# Patient Record
Sex: FEMALE | Race: WHITE | Employment: OTHER | URBAN - METROPOLITAN AREA
[De-identification: names, ages, dates, MRNs, and addresses within clinical notes are randomized per-mention and may not be internally consistent; named-entity substitution may affect disease eponyms.]

---

## 2018-06-14 ENCOUNTER — APPOINTMENT (OUTPATIENT)
Dept: GENERAL RADIOLOGY | Age: 53
End: 2018-06-14

## 2018-06-14 ENCOUNTER — HOSPITAL ENCOUNTER (EMERGENCY)
Age: 53
Discharge: HOME | End: 2018-06-14
Attending: EMERGENCY MEDICINE

## 2018-06-14 VITALS
OXYGEN SATURATION: 99 % | TEMPERATURE: 98.4 F | SYSTOLIC BLOOD PRESSURE: 126 MMHG | HEIGHT: 65 IN | BODY MASS INDEX: 20.83 KG/M2 | HEART RATE: 86 BPM | DIASTOLIC BLOOD PRESSURE: 65 MMHG | WEIGHT: 125 LBS | RESPIRATION RATE: 16 BRPM

## 2018-06-14 DIAGNOSIS — S22.32XA CLOSED FRACTURE OF ONE RIB OF LEFT SIDE, INITIAL ENCOUNTER: Primary | ICD-10-CM

## 2018-06-14 PROCEDURE — 99283 EMERGENCY DEPT VISIT LOW MDM: CPT

## 2018-06-14 PROCEDURE — 71101 X-RAY EXAM UNILAT RIBS/CHEST: CPT

## 2018-06-14 RX ORDER — HYDROCODONE BITARTRATE AND ACETAMINOPHEN 5; 325 MG/1; MG/1
1 TABLET ORAL EVERY 6 HOURS PRN
Qty: 12 TABLET | Refills: 0 | Status: SHIPPED | OUTPATIENT
Start: 2018-06-14 | End: 2018-06-17

## 2018-06-14 ASSESSMENT — ENCOUNTER SYMPTOMS
ALLERGIC/IMMUNOLOGIC NEGATIVE: 1
ABDOMINAL PAIN: 0
WHEEZING: 0
COUGH: 0
CHEST TIGHTNESS: 0
SHORTNESS OF BREATH: 0
EYES NEGATIVE: 1
VOMITING: 0
NAUSEA: 0
RHINORRHEA: 0
TROUBLE SWALLOWING: 0

## 2018-06-14 ASSESSMENT — PAIN DESCRIPTION - DESCRIPTORS: DESCRIPTORS: ACHING

## 2018-06-14 ASSESSMENT — PAIN SCALES - GENERAL: PAINLEVEL_OUTOF10: 6

## 2018-06-14 ASSESSMENT — PAIN DESCRIPTION - ORIENTATION: ORIENTATION: LEFT

## 2018-06-14 ASSESSMENT — PAIN DESCRIPTION - LOCATION: LOCATION: RIB CAGE

## 2019-05-10 ENCOUNTER — HOSPITAL ENCOUNTER (OUTPATIENT)
Dept: WOMENS IMAGING | Age: 54
Discharge: HOME | End: 2019-05-12
Payer: COMMERCIAL

## 2019-05-10 DIAGNOSIS — Z12.31 ENCOUNTER FOR SCREENING MAMMOGRAM FOR BREAST CANCER: ICD-10-CM

## 2019-05-10 PROCEDURE — 77067 SCR MAMMO BI INCL CAD: CPT

## 2023-10-20 PROBLEM — F41.0 PANIC ATTACKS: Status: ACTIVE | Noted: 2023-10-20

## 2023-10-20 PROBLEM — C43.9 MELANOMA (MULTI): Status: ACTIVE | Noted: 2023-10-20

## 2023-10-20 PROBLEM — F41.1 GAD (GENERALIZED ANXIETY DISORDER): Status: ACTIVE | Noted: 2023-10-20

## 2023-10-20 PROBLEM — E78.2 MIXED HYPERLIPIDEMIA: Status: ACTIVE | Noted: 2023-10-20

## 2023-10-20 PROBLEM — F90.9 ADHD: Status: ACTIVE | Noted: 2023-10-20

## 2023-10-20 PROBLEM — F43.20 ADJUSTMENT DISORDER: Status: ACTIVE | Noted: 2023-10-20

## 2023-10-20 PROBLEM — E55.9 VITAMIN D DEFICIENCY DISEASE: Status: ACTIVE | Noted: 2023-10-20

## 2023-10-20 RX ORDER — ALPRAZOLAM 0.25 MG/1
0.25 TABLET ORAL DAILY PRN
COMMUNITY
Start: 2022-02-25 | End: 2024-03-20 | Stop reason: SDUPTHER

## 2023-10-20 RX ORDER — ATORVASTATIN CALCIUM 20 MG/1
1 TABLET, FILM COATED ORAL NIGHTLY
COMMUNITY
End: 2024-03-20 | Stop reason: SDUPTHER

## 2023-10-20 RX ORDER — LISDEXAMFETAMINE DIMESYLATE CAPSULES 20 MG/1
20 CAPSULE ORAL EVERY MORNING
COMMUNITY
End: 2023-10-26 | Stop reason: WASHOUT

## 2023-10-20 RX ORDER — DEXTROAMPHETAMINE SACCHARATE, AMPHETAMINE ASPARTATE, DEXTROAMPHETAMINE SULFATE AND AMPHETAMINE SULFATE 7.5; 7.5; 7.5; 7.5 MG/1; MG/1; MG/1; MG/1
1 TABLET ORAL DAILY
COMMUNITY
Start: 2023-01-07 | End: 2023-10-26 | Stop reason: SDUPTHER

## 2023-10-25 ENCOUNTER — APPOINTMENT (OUTPATIENT)
Dept: PRIMARY CARE | Facility: CLINIC | Age: 58
End: 2023-10-25

## 2023-10-26 ENCOUNTER — OFFICE VISIT (OUTPATIENT)
Dept: PRIMARY CARE | Facility: CLINIC | Age: 58
End: 2023-10-26

## 2023-10-26 VITALS
BODY MASS INDEX: 21.45 KG/M2 | TEMPERATURE: 98 F | HEART RATE: 94 BPM | HEIGHT: 66 IN | DIASTOLIC BLOOD PRESSURE: 76 MMHG | SYSTOLIC BLOOD PRESSURE: 110 MMHG | WEIGHT: 133.5 LBS | OXYGEN SATURATION: 98 %

## 2023-10-26 DIAGNOSIS — F32.A DEPRESSION, UNSPECIFIED DEPRESSION TYPE: ICD-10-CM

## 2023-10-26 DIAGNOSIS — Z23 ENCOUNTER FOR IMMUNIZATION: ICD-10-CM

## 2023-10-26 DIAGNOSIS — F90.9 ATTENTION DEFICIT HYPERACTIVITY DISORDER (ADHD), UNSPECIFIED ADHD TYPE: Primary | ICD-10-CM

## 2023-10-26 PROCEDURE — 1036F TOBACCO NON-USER: CPT | Performed by: INTERNAL MEDICINE

## 2023-10-26 PROCEDURE — 90686 IIV4 VACC NO PRSV 0.5 ML IM: CPT | Performed by: INTERNAL MEDICINE

## 2023-10-26 PROCEDURE — 90471 IMMUNIZATION ADMIN: CPT | Performed by: INTERNAL MEDICINE

## 2023-10-26 PROCEDURE — 99214 OFFICE O/P EST MOD 30 MIN: CPT | Performed by: INTERNAL MEDICINE

## 2023-10-26 RX ORDER — DEXTROAMPHETAMINE SACCHARATE, AMPHETAMINE ASPARTATE, DEXTROAMPHETAMINE SULFATE AND AMPHETAMINE SULFATE 7.5; 7.5; 7.5; 7.5 MG/1; MG/1; MG/1; MG/1
1 TABLET ORAL DAILY
Qty: 30 TABLET | Refills: 0 | Status: SHIPPED | OUTPATIENT
Start: 2023-11-01 | End: 2023-10-26 | Stop reason: SDUPTHER

## 2023-10-26 RX ORDER — ALPRAZOLAM 0.25 MG/1
0.25 TABLET ORAL DAILY PRN
Qty: 7 TABLET | Refills: 0 | Status: CANCELLED | OUTPATIENT
Start: 2023-10-26

## 2023-10-26 RX ORDER — ESCITALOPRAM OXALATE 20 MG/1
20 TABLET ORAL DAILY
Qty: 30 TABLET | Refills: 5 | Status: SHIPPED | OUTPATIENT
Start: 2023-10-26 | End: 2024-01-25 | Stop reason: WASHOUT

## 2023-10-26 RX ORDER — LORAZEPAM 0.5 MG/1
0.5 TABLET ORAL DAILY
Qty: 10 TABLET | Refills: 0 | Status: SHIPPED | OUTPATIENT
Start: 2023-10-26 | End: 2023-11-05

## 2023-10-26 RX ORDER — DEXTROAMPHETAMINE SACCHARATE, AMPHETAMINE ASPARTATE, DEXTROAMPHETAMINE SULFATE AND AMPHETAMINE SULFATE 7.5; 7.5; 7.5; 7.5 MG/1; MG/1; MG/1; MG/1
1 TABLET ORAL DAILY
Qty: 30 TABLET | Refills: 0 | Status: SHIPPED | OUTPATIENT
Start: 2023-12-01 | End: 2023-10-26 | Stop reason: SDUPTHER

## 2023-10-26 RX ORDER — DEXTROAMPHETAMINE SACCHARATE, AMPHETAMINE ASPARTATE, DEXTROAMPHETAMINE SULFATE AND AMPHETAMINE SULFATE 7.5; 7.5; 7.5; 7.5 MG/1; MG/1; MG/1; MG/1
1 TABLET ORAL DAILY
Qty: 30 TABLET | Refills: 0 | Status: SHIPPED | OUTPATIENT
Start: 2024-01-01 | End: 2024-01-25 | Stop reason: SDUPTHER

## 2023-10-26 ASSESSMENT — PATIENT HEALTH QUESTIONNAIRE - PHQ9
1. LITTLE INTEREST OR PLEASURE IN DOING THINGS: NOT AT ALL
SUM OF ALL RESPONSES TO PHQ9 QUESTIONS 1 AND 2: 0
2. FEELING DOWN, DEPRESSED OR HOPELESS: NOT AT ALL

## 2023-10-26 NOTE — PROGRESS NOTES
"In SUBJECTIVE:   Dayna Lazo is a 58 y.o. female who presents for Med Management (Patient in office today for med management. ).    HISTORY OF PRESENT ILLNESS:  Dayna Lazo  is a pleasant 58-year-old female comes in for 3 months follow-up.  She is on CNS stimulant for ADD.  She is also complaining of anxiety.  She is going through some family stressors.  At times she goes into panic attack.  Patient has contract with me for a controlled medicine. The OARRS report has been checked today. There is no aberrancy. Patient wants to stay on the same medicine as it is helpful for day to day life. As of yet, patient did not experienced any obvious adverse effect. However the potential side effects have been discussed again during this visit.               Review of Systems   Constitutional:  Negative for activity change and fever.   HENT:  Negative for hearing loss, nosebleeds and tinnitus.    Eyes:  Negative for redness.   Respiratory:  Negative for chest tightness and stridor.    Cardiovascular:  Negative for chest pain, palpitations and leg swelling.   Gastrointestinal:  Negative for blood in stool.   Endocrine: Negative for cold intolerance.   Genitourinary:  Negative for hematuria.   Musculoskeletal:  Negative for joint swelling.   Skin:  Negative for rash.   Neurological:  Negative for speech difficulty and light-headedness.   Psychiatric/Behavioral:  Negative for behavioral problems.        Visit Vitals  /76 (BP Location: Left arm, Patient Position: Sitting)   Pulse 94   Temp 36.7 °C (98 °F) (Temporal)   Ht 1.664 m (5' 5.5\")   Wt 60.6 kg (133 lb 8 oz)   SpO2 98%   BMI 21.88 kg/m²   Smoking Status Never   BSA 1.67 m²             Wt Readings from Last 10 Encounters:   10/26/23 60.6 kg (133 lb 8 oz)   06/29/23 56.7 kg (125 lb)   06/08/23 55.3 kg (122 lb)   05/04/23 57.2 kg (126 lb)   01/09/23 61.6 kg (135 lb 12.8 oz)   11/03/22 59.9 kg (132 lb)   08/03/22 57.3 kg (126 lb 4 oz)   05/03/22 55.8 kg (123 lb) "   02/25/22 52.7 kg (116 lb 4 oz)   10/18/21 57.3 kg (126 lb 6.4 oz)            Physical Exam  Constitutional:       General: She is not in acute distress.     Appearance: Normal appearance.   HENT:      Head: Normocephalic.      Right Ear: Tympanic membrane normal.      Left Ear: Tympanic membrane normal.      Mouth/Throat:      Mouth: Mucous membranes are moist.   Cardiovascular:      Rate and Rhythm: Normal rate and regular rhythm.      Heart sounds: No murmur heard.  Pulmonary:      Effort: No respiratory distress.   Abdominal:      Palpations: Abdomen is soft.   Musculoskeletal:      Cervical back: Neck supple.      Right lower leg: No edema.      Left lower leg: No edema.   Skin:     Findings: No rash.   Neurological:      General: No focal deficit present.      Mental Status: She is alert and oriented to person, place, and time.   Psychiatric:         Mood and Affect: Mood normal.            RECENT LABS:  Lab Results   Component Value Date    WBC 3.4 (L) 10/19/2020    HGB 13.7 10/19/2020    HCT 42.2 10/19/2020     10/19/2020    CHOL 189 10/19/2020    TRIG 73 10/19/2020    HDL 78.0 10/19/2020    ALT 17 10/19/2020    AST 20 10/19/2020     10/19/2020    K 3.7 10/19/2020    CL 99 10/19/2020    CREATININE 0.85 10/19/2020    BUN 11 10/19/2020    CO2 31 10/19/2020    TSH 0.90 10/19/2020       IMAGING:  Patient was never admitted.  No results found.     MEDICATIONS:   Current Outpatient Medications   Medication Instructions    ALPRAZolam (XANAX) 0.25 mg, oral, Daily PRN    [START ON 1/1/2024] amphetamine-dextroamphetamine (Adderall) 30 mg tablet 30 mg, oral, Daily    atorvastatin (Lipitor) 20 mg tablet 1 tablet, oral, Nightly    escitalopram (LEXAPRO) 20 mg, oral, Daily    LORazepam (ATIVAN) 0.5 mg, oral, Daily        TODAY'S VISIT  DX:   1. Attention deficit hyperactivity disorder (ADHD), unspecified ADHD type  amphetamine-dextroamphetamine (Adderall) 30 mg tablet    DISCONTINUED:  amphetamine-dextroamphetamine (Adderall) 30 mg tablet    DISCONTINUED: amphetamine-dextroamphetamine (Adderall) 30 mg tablet      2. Encounter for immunization  Flu vaccine (IIV4) age 6 months and greater, preservative free      3. Depression, unspecified depression type  escitalopram (Lexapro) 20 mg tablet    LORazepam (Ativan) 0.5 mg tablet             MEDICAL DECISION MAKING:  A. Recent lab work and relevant imaging studies have been reviewed.    B. Relevant correspondence/notes from specialty consultants were reviewed and discussed with patient.    C. The current active medical co morbidities have been considered.   D. Medication have been sent for refill.    E. Patient will continue with current medical therapy and plan.   F. I will see patient back in 3 months.

## 2023-11-01 ASSESSMENT — ENCOUNTER SYMPTOMS
EYE REDNESS: 0
JOINT SWELLING: 0
STRIDOR: 0
HEMATURIA: 0
FEVER: 0
BLOOD IN STOOL: 0
LIGHT-HEADEDNESS: 0
ACTIVITY CHANGE: 0
SPEECH DIFFICULTY: 0
PALPITATIONS: 0
CHEST TIGHTNESS: 0

## 2024-01-25 ENCOUNTER — OFFICE VISIT (OUTPATIENT)
Dept: PRIMARY CARE | Facility: CLINIC | Age: 59
End: 2024-01-25

## 2024-01-25 VITALS
SYSTOLIC BLOOD PRESSURE: 120 MMHG | HEIGHT: 66 IN | TEMPERATURE: 98.5 F | WEIGHT: 142 LBS | OXYGEN SATURATION: 98 % | BODY MASS INDEX: 22.82 KG/M2 | HEART RATE: 89 BPM | DIASTOLIC BLOOD PRESSURE: 78 MMHG

## 2024-01-25 DIAGNOSIS — E78.2 MIXED DYSLIPIDEMIA: ICD-10-CM

## 2024-01-25 DIAGNOSIS — E55.9 VITAMIN D INSUFFICIENCY: ICD-10-CM

## 2024-01-25 DIAGNOSIS — Z00.00 WELLNESS EXAMINATION: ICD-10-CM

## 2024-01-25 DIAGNOSIS — F90.9 ATTENTION DEFICIT HYPERACTIVITY DISORDER (ADHD), UNSPECIFIED ADHD TYPE: ICD-10-CM

## 2024-01-25 DIAGNOSIS — I10 ESSENTIAL (PRIMARY) HYPERTENSION: Primary | ICD-10-CM

## 2024-01-25 PROCEDURE — 99214 OFFICE O/P EST MOD 30 MIN: CPT | Performed by: INTERNAL MEDICINE

## 2024-01-25 PROCEDURE — 1036F TOBACCO NON-USER: CPT | Performed by: INTERNAL MEDICINE

## 2024-01-25 PROCEDURE — 3074F SYST BP LT 130 MM HG: CPT | Performed by: INTERNAL MEDICINE

## 2024-01-25 PROCEDURE — 3078F DIAST BP <80 MM HG: CPT | Performed by: INTERNAL MEDICINE

## 2024-01-25 RX ORDER — DEXTROAMPHETAMINE SACCHARATE, AMPHETAMINE ASPARTATE, DEXTROAMPHETAMINE SULFATE AND AMPHETAMINE SULFATE 7.5; 7.5; 7.5; 7.5 MG/1; MG/1; MG/1; MG/1
1 TABLET ORAL DAILY
Qty: 30 TABLET | Refills: 0 | Status: SHIPPED | OUTPATIENT
Start: 2024-02-25 | End: 2024-01-25 | Stop reason: SDUPTHER

## 2024-01-25 RX ORDER — DEXTROAMPHETAMINE SACCHARATE, AMPHETAMINE ASPARTATE, DEXTROAMPHETAMINE SULFATE AND AMPHETAMINE SULFATE 7.5; 7.5; 7.5; 7.5 MG/1; MG/1; MG/1; MG/1
1 TABLET ORAL DAILY
Qty: 30 TABLET | Refills: 0 | Status: SHIPPED | OUTPATIENT
Start: 2024-02-25 | End: 2024-03-20 | Stop reason: SDUPTHER

## 2024-01-25 RX ORDER — DEXTROAMPHETAMINE SACCHARATE, AMPHETAMINE ASPARTATE, DEXTROAMPHETAMINE SULFATE AND AMPHETAMINE SULFATE 7.5; 7.5; 7.5; 7.5 MG/1; MG/1; MG/1; MG/1
1 TABLET ORAL DAILY
Qty: 30 TABLET | Refills: 0 | Status: SHIPPED | OUTPATIENT
Start: 2024-01-25 | End: 2024-01-25 | Stop reason: SDUPTHER

## 2024-01-25 ASSESSMENT — ENCOUNTER SYMPTOMS
OCCASIONAL FEELINGS OF UNSTEADINESS: 0
LOSS OF SENSATION IN FEET: 0
DEPRESSION: 0

## 2024-01-25 NOTE — PROGRESS NOTES
CHIEF COMPLAINT:    Dayna Lazo is a 58 y.o. female who presents for Follow-up (Patient here for 3 month follow-up. Patient stated that he stopped taking Lexapro due to weight gain and wants to discuss with physician. ).    HISTORY OF PRESENT ILLNESS:  Dayna Lazo  is a pleasant 58-year-old female comes here for her routine medical checkup.  She does have ADD.  And takes Adderall.  She was also going through some depressive illness this.  She was on Lexapro.  However she thought she was gaining weight on Lexapro and held the pill.  So far she is doing well.  She does not have any anxiety or panic attack.  Although patient was asking to change that group and be on some SSRI.  I discouraged her because every medicine has some sort of side effect.  It is for her best benefit if she can do without.  Patient will need annual blood work.    Patient has contract with me for a controlled medicine. The OARRS report has been checked today. There is no aberrancy. Patient wants to stay on the same medicine as it is helpful for day to day life. As of yet, patient did not experienced any obvious adverse effect. However the potential side effects have been discussed again during this visit.           Review of Systems   Constitutional:  Negative for activity change and fever.   HENT:  Negative for hearing loss, nosebleeds and tinnitus.    Eyes:  Negative for redness.   Respiratory:  Negative for chest tightness and stridor.    Cardiovascular:  Negative for chest pain, palpitations and leg swelling.   Gastrointestinal:  Negative for blood in stool.   Endocrine: Negative for cold intolerance.   Genitourinary:  Negative for hematuria.   Musculoskeletal:  Negative for joint swelling.   Skin:  Negative for rash.   Neurological:  Negative for speech difficulty and light-headedness.   Psychiatric/Behavioral:  Negative for behavioral problems.      Visit Vitals  /78 (BP Location: Left arm, Patient Position: Sitting, BP Cuff  "Size: Adult)   Pulse 89   Temp 36.9 °C (98.5 °F) (Temporal)   Ht 1.664 m (5' 5.5\")   Wt 64.4 kg (142 lb)   SpO2 98%   BMI 23.27 kg/m²   Smoking Status Never   BSA 1.73 m²         Wt Readings from Last 10 Encounters:   01/25/24 64.4 kg (142 lb)   10/26/23 60.6 kg (133 lb 8 oz)   08/01/23 57.8 kg (127 lb 6 oz)   06/29/23 56.7 kg (125 lb)   06/08/23 55.3 kg (122 lb)   05/04/23 57.2 kg (126 lb)   01/09/23 61.6 kg (135 lb 12.8 oz)   11/03/22 59.9 kg (132 lb)   08/03/22 57.3 kg (126 lb 4 oz)   05/03/22 55.8 kg (123 lb)       Physical Exam  Constitutional:       General: She is not in acute distress.     Appearance: Normal appearance.   HENT:      Head: Normocephalic.      Right Ear: Tympanic membrane normal.      Left Ear: Tympanic membrane normal.      Mouth/Throat:      Mouth: Mucous membranes are moist.   Cardiovascular:      Rate and Rhythm: Normal rate and regular rhythm.      Heart sounds: No murmur heard.  Pulmonary:      Effort: No respiratory distress.   Abdominal:      Palpations: Abdomen is soft.   Musculoskeletal:      Cervical back: Neck supple.      Right lower leg: No edema.      Left lower leg: No edema.   Skin:     Findings: No rash.   Neurological:      General: No focal deficit present.      Mental Status: She is alert and oriented to person, place, and time.   Psychiatric:         Mood and Affect: Mood normal.        RECENT LABS:  Lab Results   Component Value Date    WBC 3.4 (L) 10/19/2020    HGB 13.7 10/19/2020    HCT 42.2 10/19/2020     10/19/2020    CHOL 189 10/19/2020    TRIG 73 10/19/2020    HDL 78.0 10/19/2020    ALT 17 10/19/2020    AST 20 10/19/2020     10/19/2020    K 3.7 10/19/2020    CL 99 10/19/2020    CREATININE 0.85 10/19/2020    BUN 11 10/19/2020    CO2 31 10/19/2020    TSH 0.90 10/19/2020     IMAGING:  === 09/20/22 ===    XR TRANSFER OF OUTSIDE FILMS      Reviewed:   MEDICATIONS:   Current Outpatient Medications   Medication Instructions    ALPRAZolam (XANAX) 0.25 mg, " oral, Daily PRN    amphetamine-dextroamphetamine (Adderall) 30 mg tablet 30 mg, oral, Daily    atorvastatin (Lipitor) 20 mg tablet 1 tablet, oral, Nightly    escitalopram (LEXAPRO) 20 mg, oral, Daily    LORazepam (ATIVAN) 0.5 mg, oral, Daily      TODAY'S VISIT  DX:   1. Essential (primary) hypertension  CBC and Auto Differential      2. Attention deficit hyperactivity disorder (ADHD), unspecified ADHD type  amphetamine-dextroamphetamine (Adderall) 30 mg tablet    amphetamine-dextroamphetamine (Adderall) 30 mg tablet    amphetamine-dextroamphetamine (Adderall) 30 mg tablet      3. Vitamin D insufficiency  Vitamin D 25-Hydroxy,Total (for eval of Vitamin D levels)      4. Mixed dyslipidemia  Lipid Panel      5. Wellness examination  CBC and Auto Differential    Comprehensive Metabolic Panel    TSH with reflex to Free T4 if abnormal    Urinalysis with Reflex Microscopic    Triiodothyronine, Free         MEDICAL DECISION MAKING:  - Recent lab work and relevant imaging studies have been reviewed.    - The current active medical co morbidities have been considered.   - Relevant correspondence/notes from specialty consultants were reviewed and discussed with patient.    - Patient was given treatment as per above plan.   - Patient will continue with current medical therapy and plan.   - Medication have been sent for refill.    - Next Follow up in 3 months.

## 2024-02-14 ASSESSMENT — ENCOUNTER SYMPTOMS
PALPITATIONS: 0
FEVER: 0
EYE REDNESS: 0
LIGHT-HEADEDNESS: 0
HEMATURIA: 0
CHEST TIGHTNESS: 0
JOINT SWELLING: 0
SPEECH DIFFICULTY: 0
STRIDOR: 0
ACTIVITY CHANGE: 0
BLOOD IN STOOL: 0

## 2024-03-20 ENCOUNTER — OFFICE VISIT (OUTPATIENT)
Dept: PRIMARY CARE | Facility: CLINIC | Age: 59
End: 2024-03-20

## 2024-03-20 VITALS
BODY MASS INDEX: 22.98 KG/M2 | TEMPERATURE: 98.1 F | OXYGEN SATURATION: 98 % | DIASTOLIC BLOOD PRESSURE: 80 MMHG | SYSTOLIC BLOOD PRESSURE: 130 MMHG | HEIGHT: 66 IN | HEART RATE: 91 BPM | WEIGHT: 143 LBS

## 2024-03-20 DIAGNOSIS — F41.0 PANIC ATTACKS: ICD-10-CM

## 2024-03-20 DIAGNOSIS — F32.A DEPRESSION, UNSPECIFIED DEPRESSION TYPE: ICD-10-CM

## 2024-03-20 DIAGNOSIS — F90.9 ATTENTION DEFICIT HYPERACTIVITY DISORDER (ADHD), UNSPECIFIED ADHD TYPE: Primary | ICD-10-CM

## 2024-03-20 DIAGNOSIS — F41.9 ANXIETY: ICD-10-CM

## 2024-03-20 DIAGNOSIS — E78.2 MIXED HYPERLIPIDEMIA: ICD-10-CM

## 2024-03-20 PROCEDURE — 99214 OFFICE O/P EST MOD 30 MIN: CPT | Performed by: INTERNAL MEDICINE

## 2024-03-20 RX ORDER — ESCITALOPRAM OXALATE 20 MG/1
20 TABLET ORAL DAILY
Qty: 90 TABLET | Refills: 1 | Status: SHIPPED | OUTPATIENT
Start: 2024-03-20 | End: 2024-09-16

## 2024-03-20 RX ORDER — ALPRAZOLAM 0.25 MG/1
0.25 TABLET ORAL DAILY PRN
Qty: 10 TABLET | Refills: 0 | Status: SHIPPED | OUTPATIENT
Start: 2024-03-20

## 2024-03-20 RX ORDER — DEXTROAMPHETAMINE SACCHARATE, AMPHETAMINE ASPARTATE, DEXTROAMPHETAMINE SULFATE AND AMPHETAMINE SULFATE 7.5; 7.5; 7.5; 7.5 MG/1; MG/1; MG/1; MG/1
1 TABLET ORAL DAILY
Qty: 30 TABLET | Refills: 0 | Status: SHIPPED | OUTPATIENT
Start: 2024-04-19 | End: 2024-05-20 | Stop reason: SDUPTHER

## 2024-03-20 RX ORDER — DEXTROAMPHETAMINE SACCHARATE, AMPHETAMINE ASPARTATE, DEXTROAMPHETAMINE SULFATE AND AMPHETAMINE SULFATE 7.5; 7.5; 7.5; 7.5 MG/1; MG/1; MG/1; MG/1
1 TABLET ORAL DAILY
Qty: 30 TABLET | Refills: 0 | Status: SHIPPED | OUTPATIENT
Start: 2024-03-20 | End: 2024-03-20 | Stop reason: SDUPTHER

## 2024-03-20 RX ORDER — ATORVASTATIN CALCIUM 20 MG/1
20 TABLET, FILM COATED ORAL NIGHTLY
Qty: 90 TABLET | Refills: 1 | Status: SHIPPED | OUTPATIENT
Start: 2024-03-20

## 2024-03-20 ASSESSMENT — PATIENT HEALTH QUESTIONNAIRE - PHQ9
SUM OF ALL RESPONSES TO PHQ9 QUESTIONS 1 AND 2: 0
1. LITTLE INTEREST OR PLEASURE IN DOING THINGS: NOT AT ALL
2. FEELING DOWN, DEPRESSED OR HOPELESS: NOT AT ALL

## 2024-03-20 NOTE — PROGRESS NOTES
"CHIEF COMPLAINT:    Dayna Lazo is a 58 y.o. female who presents for Follow-up (Patient is in office today for a 3 month follow-up. Patient would like to discuss starting Lexapro again. ).    HISTORY OF PRESENT ILLNESS:  Dayna Lazo  is a pleasant 58-year-old female comes here to discuss about her anxiety and panic attack.  She also has background depression.  She thought she would do better.  She took herself off of Lexapro.  Lately she has been getting more depressed and also anxiety and panic attack.  She wants to go back on the same medication.  She has some stressors in her family.  Patient also requesting few benzodiazepine for panic attack.  Otherwise she does have history of ADHD and was to continue with Adderall.  We discussed about the side effects.  Patient has contract with me for a controlled medicine. The OARRS report has been checked today. There is no aberrancy. Patient wants to stay on the same medicine as it is helpful for day to day life. As of yet, patient did not experienced any obvious adverse effect. However the potential side effects have been discussed again during this visit.       Review of Systems   Constitutional:  Negative for activity change and fever.   HENT:  Negative for hearing loss, nosebleeds and tinnitus.    Eyes:  Negative for redness.   Respiratory:  Negative for chest tightness and stridor.    Cardiovascular:  Negative for chest pain, palpitations and leg swelling.   Gastrointestinal:  Negative for blood in stool.   Endocrine: Negative for cold intolerance.   Genitourinary:  Negative for hematuria.   Musculoskeletal:  Negative for joint swelling.   Skin:  Negative for rash.   Neurological:  Negative for speech difficulty and light-headedness.   Psychiatric/Behavioral:  Negative for behavioral problems.      Visit Vitals  /80 (BP Location: Right arm, Patient Position: Sitting, BP Cuff Size: Adult)   Pulse 91   Temp 36.7 °C (98.1 °F) (Temporal)   Ht 1.664 m (5' 5.5\") "   Wt 64.9 kg (143 lb)   SpO2 98% Comment: RA   BMI 23.43 kg/m²   Smoking Status Never   BSA 1.73 m²         Wt Readings from Last 10 Encounters:   03/20/24 64.9 kg (143 lb)   01/25/24 64.4 kg (142 lb)   10/26/23 60.6 kg (133 lb 8 oz)   08/01/23 57.8 kg (127 lb 6 oz)   06/29/23 56.7 kg (125 lb)   06/08/23 55.3 kg (122 lb)   05/04/23 57.2 kg (126 lb)   01/09/23 61.6 kg (135 lb 12.8 oz)   11/03/22 59.9 kg (132 lb)   08/03/22 57.3 kg (126 lb 4 oz)       Physical Exam  Constitutional:       General: She is not in acute distress.     Appearance: Normal appearance.   HENT:      Head: Normocephalic.      Right Ear: Tympanic membrane normal.      Left Ear: Tympanic membrane normal.      Mouth/Throat:      Mouth: Mucous membranes are moist.   Cardiovascular:      Rate and Rhythm: Normal rate and regular rhythm.      Heart sounds: No murmur heard.  Pulmonary:      Effort: No respiratory distress.   Abdominal:      Palpations: Abdomen is soft.   Musculoskeletal:      Cervical back: Neck supple.      Right lower leg: No edema.      Left lower leg: No edema.   Skin:     Findings: No rash.   Neurological:      General: No focal deficit present.      Mental Status: She is alert and oriented to person, place, and time.   Psychiatric:         Mood and Affect: Mood normal.        RECENT LABS:  Lab Results   Component Value Date    WBC 3.4 (L) 10/19/2020    HGB 13.7 10/19/2020    HCT 42.2 10/19/2020     10/19/2020    CHOL 189 10/19/2020    TRIG 73 10/19/2020    HDL 78.0 10/19/2020    ALT 17 10/19/2020    AST 20 10/19/2020     10/19/2020    K 3.7 10/19/2020    CL 99 10/19/2020    CREATININE 0.85 10/19/2020    BUN 11 10/19/2020    CO2 31 10/19/2020    TSH 0.90 10/19/2020     MEDICATIONS:   Current Outpatient Medications   Medication Instructions    ALPRAZolam (XANAX) 0.25 mg, oral, Daily PRN    [START ON 4/19/2024] amphetamine-dextroamphetamine (Adderall) 30 mg tablet 30 mg, oral, Daily    atorvastatin (LIPITOR) 20 mg, oral,  Nightly    LORazepam (ATIVAN) 0.5 mg, oral, Daily      TODAY'S VISIT  DX:   1. Attention deficit hyperactivity disorder (ADHD), unspecified ADHD type  amphetamine-dextroamphetamine (Adderall) 30 mg tablet    amphetamine-dextroamphetamine (Adderall) 30 mg tablet      2. Mixed hyperlipidemia  atorvastatin (Lipitor) 20 mg tablet      3. Anxiety  ALPRAZolam (Xanax) 0.25 mg tablet      4. Depression, unspecified depression type  escitalopram (Lexapro) 20 mg tablet      5. Panic attacks           MEDICAL DECISION MAKING:  - Recent lab work and relevant imaging studies have been reviewed.    - The current active medical co morbidities have been considered.   - Relevant correspondence/notes from specialty consultants were reviewed and discussed with patient.    - Patient was given treatment as per above plan.   - Patient will continue with current medical therapy and plan.   - Medication have been sent for refill.    - Next Follow up in 3 months.

## 2024-04-14 PROBLEM — F41.9 ANXIETY: Status: ACTIVE | Noted: 2024-04-14

## 2024-04-14 PROBLEM — F41.1 GAD (GENERALIZED ANXIETY DISORDER): Status: RESOLVED | Noted: 2023-10-20 | Resolved: 2024-04-14

## 2024-04-14 ASSESSMENT — ENCOUNTER SYMPTOMS
JOINT SWELLING: 0
CHEST TIGHTNESS: 0
SPEECH DIFFICULTY: 0
PALPITATIONS: 0
BLOOD IN STOOL: 0
EYE REDNESS: 0
LIGHT-HEADEDNESS: 0
FEVER: 0
STRIDOR: 0
HEMATURIA: 0
ACTIVITY CHANGE: 0

## 2024-05-15 ENCOUNTER — APPOINTMENT (OUTPATIENT)
Dept: PRIMARY CARE | Facility: CLINIC | Age: 59
End: 2024-05-15

## 2024-05-20 ENCOUNTER — OFFICE VISIT (OUTPATIENT)
Dept: PRIMARY CARE | Facility: CLINIC | Age: 59
End: 2024-05-20

## 2024-05-20 VITALS
OXYGEN SATURATION: 97 % | DIASTOLIC BLOOD PRESSURE: 80 MMHG | BODY MASS INDEX: 23.05 KG/M2 | TEMPERATURE: 98.3 F | HEART RATE: 72 BPM | HEIGHT: 66 IN | WEIGHT: 143.4 LBS | SYSTOLIC BLOOD PRESSURE: 125 MMHG

## 2024-05-20 DIAGNOSIS — F90.9 ATTENTION DEFICIT HYPERACTIVITY DISORDER (ADHD), UNSPECIFIED ADHD TYPE: Primary | ICD-10-CM

## 2024-05-20 DIAGNOSIS — Z12.11 SCREENING FOR COLON CANCER: ICD-10-CM

## 2024-05-20 DIAGNOSIS — Z12.31 SCREENING MAMMOGRAM FOR BREAST CANCER: ICD-10-CM

## 2024-05-20 PROCEDURE — 99214 OFFICE O/P EST MOD 30 MIN: CPT | Performed by: INTERNAL MEDICINE

## 2024-05-20 RX ORDER — DEXTROAMPHETAMINE SACCHARATE, AMPHETAMINE ASPARTATE, DEXTROAMPHETAMINE SULFATE AND AMPHETAMINE SULFATE 7.5; 7.5; 7.5; 7.5 MG/1; MG/1; MG/1; MG/1
1 TABLET ORAL DAILY
Qty: 30 TABLET | Refills: 0 | Status: SHIPPED | OUTPATIENT
Start: 2024-05-22 | End: 2024-05-20

## 2024-05-20 RX ORDER — DEXTROAMPHETAMINE SACCHARATE, AMPHETAMINE ASPARTATE, DEXTROAMPHETAMINE SULFATE AND AMPHETAMINE SULFATE 7.5; 7.5; 7.5; 7.5 MG/1; MG/1; MG/1; MG/1
1 TABLET ORAL DAILY
Qty: 30 TABLET | Refills: 0 | Status: SHIPPED | OUTPATIENT
Start: 2024-06-22 | End: 2024-05-20

## 2024-05-20 RX ORDER — CLOBETASOL PROPIONATE 0.46 MG/ML
1 SOLUTION TOPICAL 2 TIMES DAILY
COMMUNITY
Start: 2024-05-08

## 2024-05-20 RX ORDER — DEXTROAMPHETAMINE SACCHARATE, AMPHETAMINE ASPARTATE, DEXTROAMPHETAMINE SULFATE AND AMPHETAMINE SULFATE 7.5; 7.5; 7.5; 7.5 MG/1; MG/1; MG/1; MG/1
1 TABLET ORAL DAILY
Qty: 30 TABLET | Refills: 0 | Status: SHIPPED | OUTPATIENT
Start: 2024-07-22 | End: 2024-08-21

## 2024-05-20 ASSESSMENT — PATIENT HEALTH QUESTIONNAIRE - PHQ9
SUM OF ALL RESPONSES TO PHQ9 QUESTIONS 1 AND 2: 0
2. FEELING DOWN, DEPRESSED OR HOPELESS: NOT AT ALL
1. LITTLE INTEREST OR PLEASURE IN DOING THINGS: NOT AT ALL

## 2024-05-20 ASSESSMENT — ENCOUNTER SYMPTOMS
LOSS OF SENSATION IN FEET: 0
OCCASIONAL FEELINGS OF UNSTEADINESS: 0
DEPRESSION: 0

## 2024-06-15 ASSESSMENT — ENCOUNTER SYMPTOMS
CHEST TIGHTNESS: 0
STRIDOR: 0
BLOOD IN STOOL: 0
EYE REDNESS: 0
LIGHT-HEADEDNESS: 0
PALPITATIONS: 0
HEMATURIA: 0
ACTIVITY CHANGE: 0
FEVER: 0
SPEECH DIFFICULTY: 0
JOINT SWELLING: 0

## 2024-06-15 NOTE — PROGRESS NOTES
"CHIEF COMPLAINT:    Chief Complaint   Patient presents with    2 month follow-up     Follow-up with [facility]        HISTORY OF PRESENT ILLNESS:  Dayna Lazo  is a pleasant 58 y.o. female Here for medications refill.  She also need annual mammogram and colonoscopy.  Patient has contract with me for a controlled medicine. The OARRS report has been checked today. There is no aberrancy. Patient wants to stay on the same medicine as it is helpful for day to day life. As of yet, patient did not experienced any obvious adverse effect. However the potential side effects have been discussed again during this visit.       Review of Systems   Constitutional:  Negative for activity change and fever.   HENT:  Negative for hearing loss, nosebleeds and tinnitus.    Eyes:  Negative for redness.   Respiratory:  Negative for chest tightness and stridor.    Cardiovascular:  Negative for chest pain, palpitations and leg swelling.   Gastrointestinal:  Negative for blood in stool.   Endocrine: Negative for cold intolerance.   Genitourinary:  Negative for hematuria.   Musculoskeletal:  Negative for joint swelling.   Skin:  Negative for rash.   Neurological:  Negative for speech difficulty and light-headedness.   Psychiatric/Behavioral:  Negative for behavioral problems.      Visit Vitals  /80   Pulse 72   Temp 36.8 °C (98.3 °F)   Ht 1.664 m (5' 5.5\")   Wt 65 kg (143 lb 6.4 oz)   SpO2 97% Comment: RA   BMI 23.50 kg/m²   Smoking Status Never   BSA 1.73 m²         Wt Readings from Last 10 Encounters:   05/20/24 65 kg (143 lb 6.4 oz)   03/20/24 64.9 kg (143 lb)   01/25/24 64.4 kg (142 lb)   10/26/23 60.6 kg (133 lb 8 oz)   08/01/23 57.8 kg (127 lb 6 oz)   06/29/23 56.7 kg (125 lb)   06/08/23 55.3 kg (122 lb)   05/04/23 57.2 kg (126 lb)   01/09/23 61.6 kg (135 lb 12.8 oz)   11/03/22 59.9 kg (132 lb)       Physical Exam  Constitutional:       General: She is not in acute distress.     Appearance: Normal appearance.   HENT:      Head: " Normocephalic.      Right Ear: Tympanic membrane normal.      Left Ear: Tympanic membrane normal.      Mouth/Throat:      Mouth: Mucous membranes are moist.   Cardiovascular:      Rate and Rhythm: Normal rate and regular rhythm.      Heart sounds: No murmur heard.  Pulmonary:      Effort: No respiratory distress.   Abdominal:      Palpations: Abdomen is soft.   Musculoskeletal:      Cervical back: Neck supple.      Right lower leg: No edema.      Left lower leg: No edema.   Skin:     Findings: No rash.   Neurological:      General: No focal deficit present.      Mental Status: She is alert and oriented to person, place, and time.   Psychiatric:         Mood and Affect: Mood normal.          RECENT LABS:  Lab Results   Component Value Date    WBC 3.4 (L) 10/19/2020    HGB 13.7 10/19/2020    HCT 42.2 10/19/2020     10/19/2020    CHOL 189 10/19/2020    TRIG 73 10/19/2020    HDL 78.0 10/19/2020    ALT 17 10/19/2020    AST 20 10/19/2020     10/19/2020    K 3.7 10/19/2020    CL 99 10/19/2020    CREATININE 0.85 10/19/2020    BUN 11 10/19/2020    CO2 31 10/19/2020    TSH 0.90 10/19/2020     MEDICATIONS:   Current Outpatient Medications   Medication Instructions    ALPRAZolam (XANAX) 0.25 mg, oral, Daily PRN    [START ON 7/22/2024] amphetamine-dextroamphetamine (Adderall) 30 mg tablet 30 mg, oral, Daily    atorvastatin (LIPITOR) 20 mg, oral, Nightly    clobetasol (Temovate) 0.05 % external solution 1 Application, Topical, 2 times daily    escitalopram (LEXAPRO) 20 mg, oral, Daily    LORazepam (ATIVAN) 0.5 mg, oral, Daily        TODAY'S VISIT  DX:   1. Attention deficit hyperactivity disorder (ADHD), unspecified ADHD type  amphetamine-dextroamphetamine (Adderall) 30 mg tablet              2. Screening mammogram for breast cancer  BI mammo bilateral screening tomosynthesis      3. Screening for colon cancer  Colonoscopy Screening; Average Risk Patient           MEDICAL DECISION MAKING:  - The current and active  medical co morbidities have been considered.   - Recent lab work and relevant imaging studies have been reviewed.    - Relevant correspondence/notes from other specialty consultants were reviewed.    - Medication have been sent for refill.    - Next Follow up in 3 months  - Patient was given treatment as per above plan

## 2024-08-13 ENCOUNTER — TELEPHONE (OUTPATIENT)
Dept: PRIMARY CARE | Facility: CLINIC | Age: 59
End: 2024-08-13

## 2024-08-13 NOTE — TELEPHONE ENCOUNTER
The patient refilled her generic adderall 30 mg at her pharmacy . It was a different manufacture than the one they had been using due to back order. The patient states she is feel very agitated having trouble moving her arms . I called the pharmacy and they stated that the manufactures have to use the same medicine but the fillers they use can be different. She has been taking this for a week. Everything is backordered per the pharmacy. Please advise

## 2024-08-14 NOTE — TELEPHONE ENCOUNTER
JOE: Patient informed and verbalized understanding. She advised that yesterday she took a half tablet then today she didn't take any. She declined going to the ER UC or being seen in office for symptoms. She advises that they have subsided and that she has an appointment with Dr. Rowe next week and will further discuss at that time.

## 2024-08-20 ENCOUNTER — APPOINTMENT (OUTPATIENT)
Dept: PRIMARY CARE | Facility: CLINIC | Age: 59
End: 2024-08-20

## 2024-08-21 ENCOUNTER — APPOINTMENT (OUTPATIENT)
Dept: PRIMARY CARE | Facility: CLINIC | Age: 59
End: 2024-08-21

## 2024-08-28 ENCOUNTER — APPOINTMENT (OUTPATIENT)
Dept: PRIMARY CARE | Facility: CLINIC | Age: 59
End: 2024-08-28

## 2024-08-28 VITALS
HEART RATE: 100 BPM | TEMPERATURE: 98.4 F | HEIGHT: 65 IN | OXYGEN SATURATION: 98 % | DIASTOLIC BLOOD PRESSURE: 80 MMHG | BODY MASS INDEX: 17.56 KG/M2 | SYSTOLIC BLOOD PRESSURE: 124 MMHG | WEIGHT: 105.4 LBS

## 2024-08-28 DIAGNOSIS — L20.84 INTRINSIC ECZEMA: ICD-10-CM

## 2024-08-28 DIAGNOSIS — F32.A DEPRESSION, UNSPECIFIED DEPRESSION TYPE: ICD-10-CM

## 2024-08-28 DIAGNOSIS — F90.9 ATTENTION DEFICIT HYPERACTIVITY DISORDER (ADHD), UNSPECIFIED ADHD TYPE: Primary | ICD-10-CM

## 2024-08-28 DIAGNOSIS — E55.9 INADEQUATE INTAKE OF CALCIUM AND VITAMIN D: ICD-10-CM

## 2024-08-28 DIAGNOSIS — E78.2 MIXED HYPERLIPIDEMIA: ICD-10-CM

## 2024-08-28 DIAGNOSIS — Z13.89 SCREENING FOR BLOOD OR PROTEIN IN URINE: ICD-10-CM

## 2024-08-28 DIAGNOSIS — E58 INADEQUATE INTAKE OF CALCIUM AND VITAMIN D: ICD-10-CM

## 2024-08-28 PROCEDURE — 99214 OFFICE O/P EST MOD 30 MIN: CPT | Performed by: INTERNAL MEDICINE

## 2024-08-28 PROCEDURE — 3008F BODY MASS INDEX DOCD: CPT | Performed by: INTERNAL MEDICINE

## 2024-08-28 RX ORDER — CLOBETASOL PROPIONATE 0.5 MG/ML
1 SOLUTION TOPICAL 2 TIMES DAILY PRN
Qty: 50 ML | Refills: 3 | Status: SHIPPED | OUTPATIENT
Start: 2024-08-28 | End: 2025-08-28

## 2024-08-28 RX ORDER — ALPRAZOLAM 0.25 MG/1
0.25 TABLET ORAL DAILY PRN
Qty: 10 TABLET | Refills: 0 | Status: CANCELLED | OUTPATIENT
Start: 2024-08-28

## 2024-08-28 RX ORDER — ATORVASTATIN CALCIUM 20 MG/1
20 TABLET, FILM COATED ORAL NIGHTLY
Qty: 90 TABLET | Refills: 1 | Status: SHIPPED | OUTPATIENT
Start: 2024-08-28

## 2024-08-28 RX ORDER — DEXTROAMPHETAMINE SACCHARATE, AMPHETAMINE ASPARTATE, DEXTROAMPHETAMINE SULFATE AND AMPHETAMINE SULFATE 7.5; 7.5; 7.5; 7.5 MG/1; MG/1; MG/1; MG/1
1 TABLET ORAL DAILY
Qty: 30 TABLET | Refills: 0 | Status: SHIPPED | OUTPATIENT
Start: 2024-08-28 | End: 2024-08-28

## 2024-08-28 RX ORDER — DEXTROAMPHETAMINE SACCHARATE, AMPHETAMINE ASPARTATE, DEXTROAMPHETAMINE SULFATE AND AMPHETAMINE SULFATE 7.5; 7.5; 7.5; 7.5 MG/1; MG/1; MG/1; MG/1
1 TABLET ORAL DAILY
Qty: 30 TABLET | Refills: 0 | Status: SHIPPED | OUTPATIENT
Start: 2024-09-28 | End: 2024-08-28

## 2024-08-28 RX ORDER — DEXTROAMPHETAMINE SACCHARATE, AMPHETAMINE ASPARTATE, DEXTROAMPHETAMINE SULFATE AND AMPHETAMINE SULFATE 7.5; 7.5; 7.5; 7.5 MG/1; MG/1; MG/1; MG/1
1 TABLET ORAL DAILY
Qty: 30 TABLET | Refills: 0 | Status: SHIPPED | OUTPATIENT
Start: 2024-10-28 | End: 2024-11-27

## 2024-08-28 RX ORDER — ESCITALOPRAM OXALATE 20 MG/1
20 TABLET ORAL DAILY
Qty: 90 TABLET | Refills: 1 | Status: SHIPPED | OUTPATIENT
Start: 2024-08-28 | End: 2025-02-24

## 2024-08-28 ASSESSMENT — PATIENT HEALTH QUESTIONNAIRE - PHQ9
1. LITTLE INTEREST OR PLEASURE IN DOING THINGS: NOT AT ALL
2. FEELING DOWN, DEPRESSED OR HOPELESS: NOT AT ALL
SUM OF ALL RESPONSES TO PHQ9 QUESTIONS 1 AND 2: 0

## 2024-08-29 ENCOUNTER — APPOINTMENT (OUTPATIENT)
Dept: RADIOLOGY | Facility: CLINIC | Age: 59
End: 2024-08-29

## 2024-09-04 ASSESSMENT — ENCOUNTER SYMPTOMS
LIGHT-HEADEDNESS: 0
STRIDOR: 0
JOINT SWELLING: 0
SPEECH DIFFICULTY: 0
FEVER: 0
PALPITATIONS: 0
HEMATURIA: 0
ACTIVITY CHANGE: 0
EYE REDNESS: 0
CHEST TIGHTNESS: 0
BLOOD IN STOOL: 0

## 2024-09-05 NOTE — PROGRESS NOTES
Dayna HAMM Pratimaluis, [facility] 58 y.o. female was seen today:   Chief Complaint   Patient presents with    Follow-up     Patient is here today for a 3 month follow up       VISIT DAVID:  Dayna is here for 3 months follow-up.  She has adult onset ADD.  She needs her medications refilled.  She also has depressive illness for which she takes Lexapro.  She needs her annual blood work.  Her mammogram is up-to-date.  Patient has contract with me for a controlled medicine, Adderall. The OARRS report has been checked today. There is no aberrancy. Patient wants to stay on the same medicine as it is helpful for day to day life. As of yet, patient did not experienced any obvious adverse effect. However the potential side effects have been discussed again during this visit.         TODAY'S VISIT  DX:     1. Attention deficit hyperactivity disorder (ADHD), unspecified ADHD type  amphetamine-dextroamphetamine (Adderall) 30 mg tablet    Comprehensive Metabolic Panel              2. Mixed hyperlipidemia  atorvastatin (Lipitor) 20 mg tablet    Lipid Panel      3. Depression, unspecified depression type  escitalopram (Lexapro) 20 mg tablet    Triiodothyronine, Free    TSH with reflex to Free T4 if abnormal      4. Intrinsic eczema  clobetasol (Temovate) 0.05 % external solution    CBC and Auto Differential      5. Inadequate intake of calcium and vitamin D  Vitamin D 25-Hydroxy,Total (for eval of Vitamin D levels)      6. Screening for blood or protein in urine  Urinalysis with Reflex Microscopic           MEDICAL DECISION MAKING:  - Treatment and therapy plan are as above   - Active medical co morbidities have been considered.   - Recent lab work and relevant imaging studies were reviewed.    - Correspondence/notes from specialty consultants were checked.    - Next Follow up in 3 months      Review of Systems   Constitutional:  Negative for activity change and fever.   HENT:  Negative for hearing loss, nosebleeds and tinnitus.    Eyes:   "Negative for redness.   Respiratory:  Negative for chest tightness and stridor.    Cardiovascular:  Negative for chest pain, palpitations and leg swelling.   Gastrointestinal:  Negative for blood in stool.   Endocrine: Negative for cold intolerance.   Genitourinary:  Negative for hematuria.   Musculoskeletal:  Negative for joint swelling.   Skin:  Negative for rash.   Neurological:  Negative for speech difficulty and light-headedness.   Psychiatric/Behavioral:  Negative for behavioral problems.      Visit Vitals  /80 (BP Location: Left arm, Patient Position: Sitting, BP Cuff Size: Adult)   Pulse 100   Temp 36.9 °C (98.4 °F) (Temporal)   Ht 1.651 m (5' 5\")   Wt 47.8 kg (105 lb 6.4 oz)   SpO2 98% Comment: RA   BMI 17.54 kg/m²   Smoking Status Never   BSA 1.48 m²         Wt Readings from Last 5 Encounters:   08/28/24 47.8 kg (105 lb 6.4 oz)   05/20/24 65 kg (143 lb 6.4 oz)   03/20/24 64.9 kg (143 lb)   01/25/24 64.4 kg (142 lb)   10/26/23 60.6 kg (133 lb 8 oz)     Physical Exam  Constitutional:       General: She is not in acute distress.     Appearance: Normal appearance.   HENT:      Head: Normocephalic.      Right Ear: Tympanic membrane normal.      Left Ear: Tympanic membrane normal.      Mouth/Throat:      Mouth: Mucous membranes are moist.   Cardiovascular:      Rate and Rhythm: Normal rate and regular rhythm.      Heart sounds: No murmur heard.  Pulmonary:      Effort: No respiratory distress.   Abdominal:      Palpations: Abdomen is soft.   Musculoskeletal:      Cervical back: Neck supple.      Right lower leg: No edema.      Left lower leg: No edema.   Skin:     Findings: No rash.   Neurological:      General: No focal deficit present.      Mental Status: She is alert and oriented to person, place, and time.   Psychiatric:         Mood and Affect: Mood normal.          MEDICATIONS:   Current Outpatient Medications   Medication Instructions    ALPRAZolam (XANAX) 0.25 mg, oral, Daily PRN    [START ON " "10/28/2024] amphetamine-dextroamphetamine (Adderall) 30 mg tablet 30 mg, oral, Daily    atorvastatin (LIPITOR) 20 mg, oral, Nightly    clobetasol (Temovate) 0.05 % external solution 1 Application, Topical, 2 times daily PRN    escitalopram (LEXAPRO) 20 mg, oral, Daily    LORazepam (ATIVAN) 0.5 mg, oral, Daily       RECENT LABS:  Lab Results   Component Value Date    WBC 3.4 (L) 10/19/2020    HGB 13.7 10/19/2020    HCT 42.2 10/19/2020     10/19/2020    CHOL 189 10/19/2020    TRIG 73 10/19/2020    HDL 78.0 10/19/2020    ALT 17 10/19/2020    AST 20 10/19/2020     10/19/2020    K 3.7 10/19/2020    CL 99 10/19/2020    CREATININE 0.85 10/19/2020    BUN 11 10/19/2020    CO2 31 10/19/2020    TSH 0.90 10/19/2020     Lab Results   Component Value Date    GLUCOSE 95 10/19/2020    CALCIUM 9.6 10/19/2020     10/19/2020    K 3.7 10/19/2020    CO2 31 10/19/2020    CL 99 10/19/2020    BUN 11 10/19/2020    CREATININE 0.85 10/19/2020      No results found for: \"LDLCALC\"  No results found for: \"HGBA1C\"  Lab Results   Component Value Date    CREATININE 0.85 10/19/2020             P.S: This note was completed using Dragon voice recognition technology and may include unintended errors with respect to translation of words, typographical errors or grammar errors which may not have been identified while finalizing the chart.   "

## 2024-12-27 DIAGNOSIS — F32.A DEPRESSION, UNSPECIFIED DEPRESSION TYPE: ICD-10-CM

## 2024-12-27 NOTE — TELEPHONE ENCOUNTER
Patient called requesting a bridge prescription for her adderall. She has a POV 1/15/2024 however she will be out. Patient also needs lexapro refilled as well.

## 2024-12-31 RX ORDER — ESCITALOPRAM OXALATE 20 MG/1
20 TABLET ORAL DAILY
Qty: 90 TABLET | Refills: 1 | Status: SHIPPED | OUTPATIENT
Start: 2024-12-31 | End: 2025-06-29

## 2025-01-16 ENCOUNTER — APPOINTMENT (OUTPATIENT)
Dept: PRIMARY CARE | Facility: CLINIC | Age: 60
End: 2025-01-16

## 2025-01-16 DIAGNOSIS — F90.9 ATTENTION DEFICIT HYPERACTIVITY DISORDER (ADHD), UNSPECIFIED ADHD TYPE: ICD-10-CM

## 2025-01-16 PROCEDURE — 99213 OFFICE O/P EST LOW 20 MIN: CPT | Performed by: INTERNAL MEDICINE

## 2025-01-16 RX ORDER — DEXTROAMPHETAMINE SACCHARATE, AMPHETAMINE ASPARTATE, DEXTROAMPHETAMINE SULFATE AND AMPHETAMINE SULFATE 7.5; 7.5; 7.5; 7.5 MG/1; MG/1; MG/1; MG/1
1 TABLET ORAL DAILY
Qty: 30 TABLET | Refills: 0 | Status: SHIPPED | OUTPATIENT
Start: 2025-01-16 | End: 2025-01-16

## 2025-01-16 RX ORDER — DEXTROAMPHETAMINE SACCHARATE, AMPHETAMINE ASPARTATE, DEXTROAMPHETAMINE SULFATE AND AMPHETAMINE SULFATE 7.5; 7.5; 7.5; 7.5 MG/1; MG/1; MG/1; MG/1
1 TABLET ORAL DAILY
Qty: 30 TABLET | Refills: 0 | Status: SHIPPED | OUTPATIENT
Start: 2025-03-16 | End: 2025-04-15

## 2025-01-16 RX ORDER — DEXTROAMPHETAMINE SACCHARATE, AMPHETAMINE ASPARTATE, DEXTROAMPHETAMINE SULFATE AND AMPHETAMINE SULFATE 7.5; 7.5; 7.5; 7.5 MG/1; MG/1; MG/1; MG/1
1 TABLET ORAL DAILY
Qty: 30 TABLET | Refills: 0 | Status: SHIPPED | OUTPATIENT
Start: 2025-02-16 | End: 2025-01-16

## 2025-01-16 ASSESSMENT — PATIENT HEALTH QUESTIONNAIRE - PHQ9
2. FEELING DOWN, DEPRESSED OR HOPELESS: NOT AT ALL
SUM OF ALL RESPONSES TO PHQ9 QUESTIONS 1 AND 2: 0
1. LITTLE INTEREST OR PLEASURE IN DOING THINGS: NOT AT ALL

## 2025-02-27 ENCOUNTER — TELEPHONE (OUTPATIENT)
Dept: PRIMARY CARE | Facility: CLINIC | Age: 60
End: 2025-02-27

## 2025-02-27 NOTE — TELEPHONE ENCOUNTER
Voicemail left for patient to invite a return call as patient will need an appointment for the requested controlled substance.

## 2025-02-27 NOTE — TELEPHONE ENCOUNTER
Good Afternoon Clinical Team,  Patient requesting medication refill.  POV 4/17/25    ALPRAZolam (Xanax) 0.25 mg tablet [[id number]]  Dose: 0.25 mg Route: oral Frequency: Daily PRN for panic attacks   Dispense Quantity: 10 tablet Refills: 0            Pharmacy :  [address], [place]  Please Pend Request to Dr. MERCADO.    Thank You.

## 2025-03-06 ENCOUNTER — APPOINTMENT (OUTPATIENT)
Dept: PRIMARY CARE | Facility: CLINIC | Age: 60
End: 2025-03-06

## 2025-03-16 NOTE — PROGRESS NOTES
Dayna Lazo, pleasant 59 y.o. female was seen today for:      Chief Complaint   Patient presents with    Follow-up     Patient is doing a telehealth visit today for a 5 month follow up.       Dayna Lazo   Patient has had injury so she could not come to this office.  She is requesting her CNS stimulant over the phone.  Although our policy is not given him prescription of the scan over the phone but I know her personally.  And indeed she had orthopedic injury in the lower extremity.  Patient has contract with me for a controlled medicine, Adderall.  The OARRS report has been checked today. There is no aberrancy. Patient wants to stay on the same medicine as it is helpful for day to day life. As of yet, patient did not experienced any obvious adverse effect. However the potential side effects have been discussed again during this visit.         EDICAL DECISION MAKING:  - Current co morbidities have been considered.   - All pertinent labs and images were addressed as per medical necessity.    - Also reviewed relevant notes from the specialty consultants.     - Time spent before, during and after office visit, which includes coordination of care counseling was 20 minutes  - Next follow up : 3 months    TODAY'S VISIT  DX:     1. Attention deficit hyperactivity disorder (ADHD), unspecified ADHD type  amphetamine-dextroamphetamine (Adderall) 30 mg tablet                 Visit Vitals  Smoking Status Never     Wt Readings from Last 10 Encounters:   08/28/24 47.8 kg (105 lb 6.4 oz)   05/20/24 65 kg (143 lb 6.4 oz)   03/20/24 64.9 kg (143 lb)   01/25/24 64.4 kg (142 lb)   10/26/23 60.6 kg (133 lb 8 oz)   08/01/23 57.8 kg (127 lb 6 oz)   06/29/23 56.7 kg (125 lb)   06/08/23 55.3 kg (122 lb)   05/04/23 57.2 kg (126 lb)   01/09/23 61.6 kg (135 lb 12.8 oz)       MEDICATIONS:   Current Outpatient Medications   Medication Instructions    ALPRAZolam (XANAX) 0.25 mg, oral, Daily PRN    amphetamine-dextroamphetamine (Adderall) 30  mg tablet 30 mg, oral, Daily    atorvastatin (LIPITOR) 20 mg, oral, Nightly    clobetasol (Temovate) 0.05 % external solution 1 Application, Topical, 2 times daily PRN    escitalopram (LEXAPRO) 20 mg, oral, Daily       Review of Systems   Constitutional:  Negative for activity change and fever.   HENT:  Negative for hearing loss, nosebleeds and tinnitus.    Eyes:  Negative for redness.   Respiratory:  Negative for chest tightness and stridor.    Cardiovascular:  Negative for chest pain, palpitations and leg swelling.   Gastrointestinal:  Negative for blood in stool.   Endocrine: Negative for cold intolerance.   Genitourinary:  Negative for hematuria.   Musculoskeletal:  Negative for joint swelling.   Skin:  Negative for rash.   Neurological:  Negative for speech difficulty and light-headedness.   Psychiatric/Behavioral:  Negative for behavioral problems.       Physical Exam  Constitutional:       General: She is not in acute distress.     Appearance: Normal appearance.   HENT:      Head: Normocephalic.      Right Ear: Tympanic membrane normal.      Left Ear: Tympanic membrane normal.      Mouth/Throat:      Mouth: Mucous membranes are moist.   Cardiovascular:      Rate and Rhythm: Normal rate and regular rhythm.      Heart sounds: No murmur heard.  Pulmonary:      Effort: No respiratory distress.   Abdominal:      Palpations: Abdomen is soft.   Musculoskeletal:      Cervical back: Neck supple.      Right lower leg: No edema.      Left lower leg: No edema.   Skin:     Findings: No rash.   Neurological:      General: No focal deficit present.      Mental Status: She is alert and oriented to person, place, and time.   Psychiatric:         Mood and Affect: Mood normal.      RECENT LABS:  Lab Results   Component Value Date    WBC 3.4 (L) 10/19/2020    HGB 13.7 10/19/2020    HCT 42.2 10/19/2020     10/19/2020    CHOL 189 10/19/2020    TRIG 73 10/19/2020    HDL 78.0 10/19/2020    ALT 17 10/19/2020    AST 20  10/19/2020     10/19/2020    K 3.7 10/19/2020    CL 99 10/19/2020    CREATININE 0.85 10/19/2020    BUN 11 10/19/2020    CO2 31 10/19/2020    TSH 0.90 10/19/2020     Lab Results   Component Value Date    GLUCOSE 95 10/19/2020    CALCIUM 9.6 10/19/2020     10/19/2020    K 3.7 10/19/2020    CO2 31 10/19/2020    CL 99 10/19/2020    BUN 11 10/19/2020    CREATININE 0.85 10/19/2020      Lab Results   Component Value Date    CREATININE 0.85 10/19/2020       P.S: This note was completed using Dragon voice recognition technology and may include unintended errors with respect to translation of words, typographical errors or grammar errors which may not have been identified while finalizing the chart.

## 2025-04-14 ENCOUNTER — TELEPHONE (OUTPATIENT)
Dept: PRIMARY CARE | Facility: CLINIC | Age: 60
End: 2025-04-14

## 2025-04-14 NOTE — TELEPHONE ENCOUNTER
Patient called the office today stating that they have the flu, they feel weak and is experiencing nausea and diarrhea. They are wanting to know if there is anything that can be called in. Please advise

## 2025-04-17 ENCOUNTER — APPOINTMENT (OUTPATIENT)
Dept: PRIMARY CARE | Facility: CLINIC | Age: 60
End: 2025-04-17

## 2025-04-22 ENCOUNTER — APPOINTMENT (OUTPATIENT)
Dept: PRIMARY CARE | Facility: CLINIC | Age: 60
End: 2025-04-22

## 2025-04-22 VITALS
HEART RATE: 79 BPM | HEIGHT: 65 IN | BODY MASS INDEX: 23.03 KG/M2 | SYSTOLIC BLOOD PRESSURE: 130 MMHG | TEMPERATURE: 98.6 F | DIASTOLIC BLOOD PRESSURE: 84 MMHG | OXYGEN SATURATION: 98 % | RESPIRATION RATE: 18 BRPM | WEIGHT: 138.2 LBS

## 2025-04-22 DIAGNOSIS — F32.A DEPRESSION, UNSPECIFIED DEPRESSION TYPE: ICD-10-CM

## 2025-04-22 DIAGNOSIS — F90.9 ATTENTION DEFICIT HYPERACTIVITY DISORDER (ADHD), UNSPECIFIED ADHD TYPE: Primary | ICD-10-CM

## 2025-04-22 DIAGNOSIS — Z12.11 SCREENING FOR COLON CANCER: ICD-10-CM

## 2025-04-22 DIAGNOSIS — Z12.31 SCREENING MAMMOGRAM FOR BREAST CANCER: ICD-10-CM

## 2025-04-22 DIAGNOSIS — M81.0 AGE-RELATED OSTEOPOROSIS WITHOUT CURRENT PATHOLOGICAL FRACTURE: ICD-10-CM

## 2025-04-22 DIAGNOSIS — F41.9 ANXIETY: ICD-10-CM

## 2025-04-22 DIAGNOSIS — E78.2 MIXED HYPERLIPIDEMIA: ICD-10-CM

## 2025-04-22 PROCEDURE — 3008F BODY MASS INDEX DOCD: CPT | Performed by: INTERNAL MEDICINE

## 2025-04-22 PROCEDURE — 99214 OFFICE O/P EST MOD 30 MIN: CPT | Performed by: INTERNAL MEDICINE

## 2025-04-22 RX ORDER — DEXTROAMPHETAMINE SACCHARATE, AMPHETAMINE ASPARTATE, DEXTROAMPHETAMINE SULFATE AND AMPHETAMINE SULFATE 7.5; 7.5; 7.5; 7.5 MG/1; MG/1; MG/1; MG/1
1 TABLET ORAL DAILY
Qty: 30 TABLET | Refills: 0 | Status: SHIPPED | OUTPATIENT
Start: 2025-05-22 | End: 2025-04-22

## 2025-04-22 RX ORDER — ATORVASTATIN CALCIUM 20 MG/1
20 TABLET, FILM COATED ORAL NIGHTLY
Qty: 90 TABLET | Refills: 3 | Status: SHIPPED | OUTPATIENT
Start: 2025-04-22 | End: 2026-04-22

## 2025-04-22 RX ORDER — DEXTROAMPHETAMINE SACCHARATE, AMPHETAMINE ASPARTATE, DEXTROAMPHETAMINE SULFATE AND AMPHETAMINE SULFATE 7.5; 7.5; 7.5; 7.5 MG/1; MG/1; MG/1; MG/1
1 TABLET ORAL DAILY
Qty: 30 TABLET | Refills: 0 | Status: SHIPPED | OUTPATIENT
Start: 2025-06-22 | End: 2025-07-22

## 2025-04-22 RX ORDER — ESCITALOPRAM OXALATE 20 MG/1
20 TABLET ORAL DAILY
Qty: 90 TABLET | Refills: 3 | Status: SHIPPED | OUTPATIENT
Start: 2025-04-22 | End: 2026-04-22

## 2025-04-22 RX ORDER — DEXTROAMPHETAMINE SACCHARATE, AMPHETAMINE ASPARTATE, DEXTROAMPHETAMINE SULFATE AND AMPHETAMINE SULFATE 7.5; 7.5; 7.5; 7.5 MG/1; MG/1; MG/1; MG/1
1 TABLET ORAL DAILY
Qty: 30 TABLET | Refills: 0 | Status: SHIPPED | OUTPATIENT
Start: 2025-04-22 | End: 2025-04-22

## 2025-04-22 RX ORDER — ALPRAZOLAM 0.25 MG/1
0.25 TABLET ORAL DAILY PRN
Qty: 10 TABLET | Refills: 0 | Status: SHIPPED | OUTPATIENT
Start: 2025-04-22

## 2025-04-24 ENCOUNTER — TELEPHONE (OUTPATIENT)
Dept: GASTROENTEROLOGY | Facility: EXTERNAL LOCATION | Age: 60
End: 2025-04-24

## 2025-04-24 NOTE — PROGRESS NOTES
Dayna Lazo, pleasant 59 y.o. female was seen today for:      Chief Complaint   Patient presents with    Med Refill       Dayna Lazo   Here for medications refill.  She is currently on Adderall.  She also takes antianxiety medicine.  Patient is sad today in the office.  She is also anxious.  She has lost her  and very close friend, Kofi who  in sleep unexpectedly.  Patient is grieving and mourning.  However she is not suicidal or homicidal.  She is a bit overwhelmed.  Other she needs to get her blood work done.  She needs her medications refilled.  She is also due for mammogram and DEXA scan.  This time patient is serious about taking good care of herself.    Patient has contract with me for a controlled medicine, Adderall, alprazolam.  The OARRS report has been checked today. There is no aberrancy. Patient wants to stay on the same medicine as it is helpful for day to day life. As of yet, patient did not experienced any obvious adverse effect. However the potential side effects have been discussed again during this visit.   Adderall      MEDICAL DECISION MAKING:  - Current co morbidities have been considered.   - All pertinent labs and images were addressed as per medical necessity.    - Also reviewed relevant notes from the specialty consultants.     - Time spent before, during and after office visit, which includes coordination of care counseling was 32 minutes  - Next follow up : 3 months    TODAY'S VISIT  DX:     1. Attention deficit hyperactivity disorder (ADHD), unspecified ADHD type  amphetamine-dextroamphetamine (Adderall) 30 mg tablet              2. Mixed hyperlipidemia  atorvastatin (Lipitor) 20 mg tablet      3. Anxiety  ALPRAZolam (Xanax) 0.25 mg tablet      4. Depression, unspecified depression type  escitalopram (Lexapro) 20 mg tablet      5. Screening mammogram for breast cancer  BI mammo bilateral screening tomosynthesis      6. Age-related osteoporosis without current  "pathological fracture  XR DEXA bone density      7. Screening for colon cancer  Colonoscopy Screening; Average Risk Patient           Visit Vitals  /84 (BP Location: Left arm, Patient Position: Sitting, BP Cuff Size: Large adult)   Pulse 79   Temp 37 °C (98.6 °F) (Temporal)   Resp 18   Ht 1.651 m (5' 5\")   Wt 62.7 kg (138 lb 3.2 oz)   SpO2 98%   BMI 23.00 kg/m²   Smoking Status Never   BSA 1.7 m²     Physical Exam  Constitutional:       General: She is not in acute distress.     Appearance: Normal appearance.   HENT:      Head: Normocephalic.      Right Ear: Tympanic membrane normal.      Left Ear: Tympanic membrane normal.      Mouth/Throat:      Mouth: Mucous membranes are moist.   Cardiovascular:      Rate and Rhythm: Normal rate and regular rhythm.      Heart sounds: No murmur heard.  Pulmonary:      Effort: No respiratory distress.   Abdominal:      Palpations: Abdomen is soft.   Musculoskeletal:      Cervical back: Neck supple.      Right lower leg: No edema.      Left lower leg: No edema.   Skin:     Findings: No rash.   Neurological:      General: No focal deficit present.      Mental Status: She is alert and oriented to person, place, and time.   Psychiatric:         Mood and Affect: Mood normal.         Wt Readings from Last 10 Encounters:   04/22/25 62.7 kg (138 lb 3.2 oz)   08/28/24 47.8 kg (105 lb 6.4 oz)   05/20/24 65 kg (143 lb 6.4 oz)   03/20/24 64.9 kg (143 lb)   01/25/24 64.4 kg (142 lb)   10/26/23 60.6 kg (133 lb 8 oz)   08/01/23 57.8 kg (127 lb 6 oz)   06/29/23 56.7 kg (125 lb)   06/08/23 55.3 kg (122 lb)   05/04/23 57.2 kg (126 lb)         MEDICATIONS:   Current Outpatient Medications   Medication Instructions    ALPRAZolam (XANAX) 0.25 mg, oral, Daily PRN    [START ON 6/22/2025] amphetamine-dextroamphetamine (Adderall) 30 mg tablet 30 mg, oral, Daily    atorvastatin (LIPITOR) 20 mg, oral, Nightly    escitalopram (LEXAPRO) 20 mg, oral, Daily       Review of Systems   Constitutional:  No " activity change or fever   HENT:  Denies ringing ears or nose bleed   Respiratory:  Denies stridor. No blood in sputum   Cardiovascular:  Denies chest pain, no sudden excessive sweating   Gastrointestinal:  No sour burping, no blood in stool    Genitourinary:  Denies blood in urine    Musculoskeletal:  No joint redness or swelling    Skin:  No new spot changing color or shape or border    Neurological:  No speech difficulty, facial droop    Psychiatric/Behavioral:  No agitation, denies Hallucination     RECENT LABS:  Lab Results   Component Value Date    WBC 3.4 (L) 10/19/2020    HGB 13.7 10/19/2020    HCT 42.2 10/19/2020     10/19/2020    CHOL 189 10/19/2020    TRIG 73 10/19/2020    HDL 78.0 10/19/2020    ALT 17 10/19/2020    AST 20 10/19/2020     10/19/2020    K 3.7 10/19/2020    CL 99 10/19/2020    CREATININE 0.85 10/19/2020    BUN 11 10/19/2020    CO2 31 10/19/2020    TSH 0.90 10/19/2020     Lab Results   Component Value Date    GLUCOSE 95 10/19/2020    CALCIUM 9.6 10/19/2020     10/19/2020    K 3.7 10/19/2020    CO2 31 10/19/2020    CL 99 10/19/2020    BUN 11 10/19/2020    CREATININE 0.85 10/19/2020      Lab Results   Component Value Date    CREATININE 0.85 10/19/2020       P.S: This note was completed using Dragon voice recognition technology and may include unintended errors with respect to translation of words, typography or grammatical errors. They may not have been identified while finalizing the chart.

## 2025-06-05 ENCOUNTER — TELEMEDICINE (OUTPATIENT)
Dept: PRIMARY CARE | Facility: CLINIC | Age: 60
End: 2025-06-05

## 2025-06-05 DIAGNOSIS — F90.9 ATTENTION DEFICIT HYPERACTIVITY DISORDER (ADHD), UNSPECIFIED ADHD TYPE: Primary | ICD-10-CM

## 2025-06-05 DIAGNOSIS — F41.9 ANXIETY: ICD-10-CM

## 2025-06-05 PROCEDURE — 99214 OFFICE O/P EST MOD 30 MIN: CPT | Performed by: INTERNAL MEDICINE

## 2025-06-05 RX ORDER — ATOMOXETINE 25 MG/1
25 CAPSULE ORAL DAILY
Qty: 30 CAPSULE | Refills: 2 | Status: SHIPPED | OUTPATIENT
Start: 2025-06-05 | End: 2026-06-05

## 2025-06-05 ASSESSMENT — PATIENT HEALTH QUESTIONNAIRE - PHQ9
SUM OF ALL RESPONSES TO PHQ9 QUESTIONS 1 AND 2: 2
1. LITTLE INTEREST OR PLEASURE IN DOING THINGS: MORE THAN HALF THE DAYS
2. FEELING DOWN, DEPRESSED OR HOPELESS: NOT AT ALL

## 2025-06-09 NOTE — PROGRESS NOTES
Subjective     Patient ID: Dayna Lazo is a 59 y.o. female who presents for Med Management.  History of Present Illness  The patient presents via virtual visit for evaluation of ADHD.  Patient was on video conference with me today.  Symptom in the middle video did not work so we finished discussion over the phone.    She reports that her current medication regimen has been beneficial in enhancing her focus, which she perceives as necessary given her current life circumstances. However, she has misplaced her Adderall prescription, which she typically keeps in the glove compartment of her car for convenience. She is uncertain if it was stolen or inadvertently discarded with other paperwork. She does not believe she is dependent on the medication but acknowledges experiencing fatigue for a few days, which she attributes to discontinuation of the medication. Despite this, she feels her condition has stabilized and expresses a desire to continue with the current treatment plan.    Objective   There were no vitals filed for this visit.  Wt Readings from Last 10 Encounters:   04/22/25 62.7 kg (138 lb 3.2 oz)   08/28/24 47.8 kg (105 lb 6.4 oz)   05/20/24 65 kg (143 lb 6.4 oz)   03/20/24 64.9 kg (143 lb)   01/25/24 64.4 kg (142 lb)   10/26/23 60.6 kg (133 lb 8 oz)   08/01/23 57.8 kg (127 lb 6 oz)   06/29/23 56.7 kg (125 lb)   06/08/23 55.3 kg (122 lb)   05/04/23 57.2 kg (126 lb)       Medication:  Current Outpatient Medications   Medication Instructions    ALPRAZolam (XANAX) 0.25 mg, oral, Daily PRN    [START ON 6/22/2025] amphetamine-dextroamphetamine (Adderall) 30 mg tablet 30 mg, oral, Daily    atomoxetine (STRATTERA) 25 mg, oral, Daily, Swallow capsule whole; do not open. If opened accidentally, do not touch eyes; wash hands immediately (product is an eye irritant).    atorvastatin (LIPITOR) 20 mg, oral, Nightly    escitalopram (LEXAPRO) 20 mg, oral, Daily       Physical Exam  Constitutional:       General: She is not  in acute distress.  HENT:      Nose: No rhinorrhea.   Pulmonary:      Effort: Pulmonary effort is normal.      Breath sounds: No stridor. No wheezing.   Neurological:      Mental Status: She is alert and oriented to person, place, and time.   Psychiatric:         Thought Content: Thought content normal.       Review of Systems:  Respiratory:  Denies stridor. No blood in sputum   Cardiovascular:  Denies chest pain, no sudden excessive sweating   Gastrointestinal:  Denies sour burping, no blood in stool    Genitourinary:  Denies blood in urine    Skin:  No new spot changing color or shape or border    Neurological: Denies speech difficulty, no memory disturbance        Recent Labs:   Lab Results   Component Value Date    WBC 3.4 (L) 10/19/2020    HGB 13.7 10/19/2020    HCT 42.2 10/19/2020     10/19/2020    CHOL 189 10/19/2020    TRIG 73 10/19/2020    HDL 78.0 10/19/2020    ALT 17 10/19/2020    AST 20 10/19/2020     10/19/2020    K 3.7 10/19/2020    CL 99 10/19/2020    CREATININE 0.85 10/19/2020    BUN 11 10/19/2020    CO2 31 10/19/2020    TSH 0.90 10/19/2020     Lab Results   Component Value Date    GLUCOSE 95 10/19/2020    CALCIUM 9.6 10/19/2020     10/19/2020    K 3.7 10/19/2020    CO2 31 10/19/2020    CL 99 10/19/2020    BUN 11 10/19/2020    CREATININE 0.85 10/19/2020      Lab Results   Component Value Date    CREATININE 0.85 10/19/2020         Diagnosis and Orders:     Diagnoses and all orders for this visit:  Attention deficit hyperactivity disorder (ADHD), unspecified ADHD type  -     atomoxetine (Strattera) 25 mg capsule; Take 1 capsule (25 mg) by mouth once daily. Swallow capsule whole; do not open. If opened accidentally, do not touch eyes; wash hands immediately (product is an eye irritant).  Anxiety       Assessment & Plan  1. Attention deficit hyperactivity disorder.  - Misplaced Adderall prescription and unable to find it.  - Reports feeling very tired for a couple of days, which she  attributes to being off Adderall, but currently feels alright and does not believe she is dependent on it.  - Discussed Strattera as a second-line treatment for ADHD. Many patients have reported positive outcomes with this medication.  - Prescription for Strattera provided with 30 pills and 2 subsequent refills. Advised to monitor effectiveness and side effects. Prescription sent to pharmacy.    Follow-up  The patient will follow up on 07/15/2025.              This medical note was created with the assistance of artificial intelligence (AI) for documentation purposes. The content has been reviewed and confirmed by the healthcare provider for accuracy and completeness. Patient consented to the use of audio recording and use of AI during their visit.

## 2025-07-22 ENCOUNTER — APPOINTMENT (OUTPATIENT)
Dept: PRIMARY CARE | Facility: CLINIC | Age: 60
End: 2025-07-22

## 2025-07-31 ENCOUNTER — APPOINTMENT (OUTPATIENT)
Dept: PRIMARY CARE | Facility: CLINIC | Age: 60
End: 2025-07-31

## 2025-07-31 VITALS
OXYGEN SATURATION: 98 % | WEIGHT: 142 LBS | BODY MASS INDEX: 23.66 KG/M2 | SYSTOLIC BLOOD PRESSURE: 122 MMHG | HEIGHT: 65 IN | TEMPERATURE: 97.6 F | DIASTOLIC BLOOD PRESSURE: 76 MMHG | HEART RATE: 71 BPM

## 2025-07-31 DIAGNOSIS — M81.0 AGE-RELATED OSTEOPOROSIS WITHOUT CURRENT PATHOLOGICAL FRACTURE: ICD-10-CM

## 2025-07-31 DIAGNOSIS — Z00.00 WELLNESS EXAMINATION: Primary | ICD-10-CM

## 2025-07-31 DIAGNOSIS — F41.9 ANXIETY: ICD-10-CM

## 2025-07-31 DIAGNOSIS — F90.9 ATTENTION DEFICIT HYPERACTIVITY DISORDER (ADHD), UNSPECIFIED ADHD TYPE: ICD-10-CM

## 2025-07-31 PROCEDURE — 99396 PREV VISIT EST AGE 40-64: CPT | Performed by: INTERNAL MEDICINE

## 2025-07-31 PROCEDURE — 1036F TOBACCO NON-USER: CPT | Performed by: INTERNAL MEDICINE

## 2025-07-31 PROCEDURE — 3008F BODY MASS INDEX DOCD: CPT | Performed by: INTERNAL MEDICINE

## 2025-07-31 RX ORDER — DEXTROAMPHETAMINE SACCHARATE, AMPHETAMINE ASPARTATE MONOHYDRATE, DEXTROAMPHETAMINE SULFATE AND AMPHETAMINE SULFATE 7.5; 7.5; 7.5; 7.5 MG/1; MG/1; MG/1; MG/1
30 CAPSULE, EXTENDED RELEASE ORAL EVERY MORNING
Qty: 30 CAPSULE | Refills: 0 | Status: SHIPPED | OUTPATIENT
Start: 2025-07-31 | End: 2025-08-30

## 2025-07-31 RX ORDER — DEXTROAMPHETAMINE SACCHARATE, AMPHETAMINE ASPARTATE MONOHYDRATE, DEXTROAMPHETAMINE SULFATE AND AMPHETAMINE SULFATE 7.5; 7.5; 7.5; 7.5 MG/1; MG/1; MG/1; MG/1
30 CAPSULE, EXTENDED RELEASE ORAL EVERY MORNING
Qty: 30 CAPSULE | Refills: 0 | Status: SHIPPED | OUTPATIENT
Start: 2025-08-30 | End: 2025-09-29

## 2025-07-31 RX ORDER — DEXTROAMPHETAMINE SACCHARATE, AMPHETAMINE ASPARTATE MONOHYDRATE, DEXTROAMPHETAMINE SULFATE AND AMPHETAMINE SULFATE 7.5; 7.5; 7.5; 7.5 MG/1; MG/1; MG/1; MG/1
30 CAPSULE, EXTENDED RELEASE ORAL EVERY MORNING
Qty: 30 CAPSULE | Refills: 0 | Status: SHIPPED | OUTPATIENT
Start: 2025-09-29 | End: 2025-10-29

## 2025-07-31 ASSESSMENT — PATIENT HEALTH QUESTIONNAIRE - PHQ9
SUM OF ALL RESPONSES TO PHQ9 QUESTIONS 1 AND 2: 1
1. LITTLE INTEREST OR PLEASURE IN DOING THINGS: NOT AT ALL
2. FEELING DOWN, DEPRESSED OR HOPELESS: SEVERAL DAYS

## 2025-07-31 ASSESSMENT — ENCOUNTER SYMPTOMS
DEPRESSION: 1
LOSS OF SENSATION IN FEET: 0
OCCASIONAL FEELINGS OF UNSTEADINESS: 0

## 2025-07-31 NOTE — PROGRESS NOTES
CHIEF COMPLAINT:    Chief Complaint   Patient presents with    Annual Exam        HISTORY OF PRESENT ILLNESS:    Dayna Lazo  is a pleasant 59 y.o. female Comes today for annual medical check up.  Dayna Lazo is overall doing well. Chronic medical conditions are stable with current medical management. Memory and cognitive function are assessed with simple verbal cue and interview. Preventative Immunizations are age appropriately up to date. Current  home medications have been reconciled. The healthy lifestyle has been reinforced. Encouraged continued avoidance of tobacco, alcohol, non prescription drugs and poly pharmacy. Functional capacity has been assessed with quick balance and gait tests. Discussed about fall risk and safety measures at home and outside. Age appropriate cancer screening tests were recommended and ordered today. Discussed in details about advance directives, code status and health care Power of  (POA). All together it took about 16 minutes and the chart was updated. Discussed about mental health and wellbeing. The depression screening questionnaire (PHQ 9) was performed and plan was discussed for 5 mins. Another 5 minute was spent on screening the social determinants of health (SDOH), specifically housing, food insecurity, utilities and transportation.  All physical, mental social and spiritual aspects of radha were evaluated, assessed and appropriately addressed. Today's office vital signs, recent lab results, EKG and imaging studies were reviewed. Patient expressed concern about ADHD, depressive illness, anxiety.  So a complete physical exam was performed to evaluate and address the issue.      She was given a 3-month prescription for Adderall unfortunately she lost 2 months of the 3-month prescription once unable to fill it again due to it being a controlled substance.  She was given Strattera but stated that that did not work.  She was able to maintain her ADHD symptoms with an  "increase in energy drinks.  She reports not wanting to be the type of person that needs to have a substance to help her maintain her symptoms but reports that the Adderall really helps her \"keep her life together\".  She said that her  recently passed away and that has been a major stress in her life however this person left her financial stability to be able to retire in October possibly which she believes will decrease her stress level.  She has a support system with her daughter who encouraged her to see a therapist.  She has already established with a therapist and is willing to make an appointment and see her again.  She wants to be the type of person who is able to cope with her own problems and would like the tools to do so.    Denies tobacco use, alcohol use denies illicit drugs.    Past medical history significant for, anxiety, depression, ADHD    TODAY'S VISIT  DX:     Wellness examination  Overall health is stable and at base line. Will continue with current medical therapy and plan.  Immunizations, cancer screening tests are age appropriately up to date.  Attention deficit hyperactivity disorder (ADHD), unspecified ADHD type  Amphetamine-dextroamphetamine XR (Adderall XR) 30 mg 24 hr capsule; Take 1 capsule (30 mg) by mouth   Anxiety  Patient will follow-up with Dr. Neeta Servin, psychologist      Review of Systems   Constitutional:  No activity change or fever   HENT:  Denies ringing ears or nose bleed   Respiratory:  Denies stridor. No blood in sputum   Cardiovascular:  Denies chest pain, no sudden excessive sweating   Gastrointestinal:  No sour burping, no blood in stool    Genitourinary:  Denies blood in urine    Musculoskeletal:  No joint redness or swelling    Skin:  No new spot changing color or shape or border    Neurological:  No speech difficulty, facial droop    Psychiatric/Behavioral:  No agitation, denies Hallucination      Visit Vitals  /76 (BP Location: Left arm, " "Patient Position: Sitting, BP Cuff Size: Adult)   Pulse 71   Temp 36.4 °C (97.6 °F) (Temporal)   Ht 1.651 m (5' 5\")   Wt 64.4 kg (142 lb)   SpO2 98% Comment: RA   BMI 23.63 kg/m²   Smoking Status Never   BSA 1.72 m²         Wt Readings from Last 10 Encounters:   07/31/25 64.4 kg (142 lb)   04/22/25 62.7 kg (138 lb 3.2 oz)   08/28/24 47.8 kg (105 lb 6.4 oz)   05/20/24 65 kg (143 lb 6.4 oz)   03/20/24 64.9 kg (143 lb)   01/25/24 64.4 kg (142 lb)   10/26/23 60.6 kg (133 lb 8 oz)   08/01/23 57.8 kg (127 lb 6 oz)   06/29/23 56.7 kg (125 lb)   06/08/23 55.3 kg (122 lb)       Physical Exam  Constitutional:       Appearance: She is normal weight.     Cardiovascular:      Rate and Rhythm: Normal rate and regular rhythm.      Pulses: Normal pulses.      Heart sounds: Normal heart sounds.   Pulmonary:      Effort: Pulmonary effort is normal.      Breath sounds: Normal breath sounds.   Abdominal:      General: Abdomen is flat.      Palpations: Abdomen is soft.     Musculoskeletal:      Right lower leg: No edema.      Left lower leg: No edema.     Skin:     General: Skin is warm and dry.     Neurological:      General: No focal deficit present.      Mental Status: She is alert. Mental status is at baseline.     Psychiatric:         Mood and Affect: Mood normal.         Thought Content: Thought content normal.         Judgment: Judgment normal.          RECENT LABS:  Lab Results   Component Value Date    WBC 3.4 (L) 10/19/2020    HGB 13.7 10/19/2020    HCT 42.2 10/19/2020     10/19/2020    CHOL 189 10/19/2020    TRIG 73 10/19/2020    HDL 78.0 10/19/2020    ALT 17 10/19/2020    AST 20 10/19/2020     10/19/2020    K 3.7 10/19/2020    CL 99 10/19/2020    CREATININE 0.85 10/19/2020    BUN 11 10/19/2020    CO2 31 10/19/2020    TSH 0.90 10/19/2020     Lab Results   Component Value Date    GLUCOSE 95 10/19/2020    CALCIUM 9.6 10/19/2020     10/19/2020    K 3.7 10/19/2020    CO2 31 10/19/2020    CL 99 10/19/2020    BUN " 11 10/19/2020    CREATININE 0.85 10/19/2020      MEDICATIONS:   Current Outpatient Medications   Medication Instructions    ALPRAZolam (XANAX) 0.25 mg, oral, Daily PRN    amphetamine-dextroamphetamine XR (Adderall XR) 30 mg 24 hr capsule 30 mg, oral, Every morning, Do not crush or chew.    [START ON 8/30/2025] amphetamine-dextroamphetamine XR (Adderall XR) 30 mg 24 hr capsule 30 mg, oral, Every morning, Do not crush or chew.    [START ON 9/29/2025] amphetamine-dextroamphetamine XR (Adderall XR) 30 mg 24 hr capsule 30 mg, oral, Every morning, Do not crush or chew.    atorvastatin (LIPITOR) 20 mg, oral, Nightly    escitalopram (LEXAPRO) 20 mg, oral, Daily        MEDICAL DECISION MAKING:  - The current and active medical co morbidities have been considered.   - Recent lab work and relevant imaging studies have been reviewed.    - Relevant correspondence/notes from other specialty consultants were reviewed.    - Medication have been sent for refill.    - Next Follow up in 3 months or unless clinically indicated  - Patient was given treatment as per above plan          P.S: This note was completed using Dragon voice recognition technology and may include unintended errors with respect to translation of words, typography or grammatical errors. They may not have been identified while finalizing the chart.

## 2025-08-04 ENCOUNTER — TELEPHONE (OUTPATIENT)
Dept: PRIMARY CARE | Facility: CLINIC | Age: 60
End: 2025-08-04

## 2025-08-04 NOTE — TELEPHONE ENCOUNTER
Patient called the office today wanting to know why she received Adderall XR instead of regular Adderall. Left VM to patient advising that it looks like [facility] discontinued the Adderall 30mg due to it being ineffective. Left office number to call back for any questions or concerns

## 2025-08-21 ENCOUNTER — OFFICE VISIT (OUTPATIENT)
Dept: PRIMARY CARE | Facility: CLINIC | Age: 60
End: 2025-08-21

## 2025-08-21 VITALS
SYSTOLIC BLOOD PRESSURE: 124 MMHG | HEART RATE: 80 BPM | WEIGHT: 139.35 LBS | OXYGEN SATURATION: 97 % | TEMPERATURE: 98.5 F | BODY MASS INDEX: 23.19 KG/M2 | DIASTOLIC BLOOD PRESSURE: 72 MMHG

## 2025-08-21 DIAGNOSIS — F32.A DEPRESSION, UNSPECIFIED DEPRESSION TYPE: ICD-10-CM

## 2025-08-21 DIAGNOSIS — F90.0 ATTENTION DEFICIT HYPERACTIVITY DISORDER (ADHD), PREDOMINANTLY INATTENTIVE TYPE: ICD-10-CM

## 2025-08-21 DIAGNOSIS — F43.20 ADJUSTMENT DISORDER, UNSPECIFIED TYPE: Primary | ICD-10-CM

## 2025-08-21 RX ORDER — ESCITALOPRAM OXALATE 20 MG/1
20 TABLET ORAL DAILY
Qty: 90 TABLET | Refills: 3 | Status: SHIPPED | OUTPATIENT
Start: 2025-08-21 | End: 2026-08-21

## 2025-08-21 RX ORDER — DEXTROAMPHETAMINE SACCHARATE, AMPHETAMINE ASPARTATE, DEXTROAMPHETAMINE SULFATE AND AMPHETAMINE SULFATE 7.5; 7.5; 7.5; 7.5 MG/1; MG/1; MG/1; MG/1
30 TABLET ORAL DAILY
Qty: 30 TABLET | Refills: 0 | Status: SHIPPED | OUTPATIENT
Start: 2025-08-21 | End: 2025-09-20

## 2025-08-21 RX ORDER — DEXTROAMPHETAMINE SACCHARATE, AMPHETAMINE ASPARTATE, DEXTROAMPHETAMINE SULFATE AND AMPHETAMINE SULFATE 7.5; 7.5; 7.5; 7.5 MG/1; MG/1; MG/1; MG/1
30 TABLET ORAL DAILY
Qty: 30 TABLET | Refills: 0 | Status: SHIPPED | OUTPATIENT
Start: 2025-10-20 | End: 2025-11-19

## 2025-08-21 RX ORDER — DEXTROAMPHETAMINE SACCHARATE, AMPHETAMINE ASPARTATE, DEXTROAMPHETAMINE SULFATE AND AMPHETAMINE SULFATE 7.5; 7.5; 7.5; 7.5 MG/1; MG/1; MG/1; MG/1
30 TABLET ORAL DAILY
Qty: 30 TABLET | Refills: 0 | Status: SHIPPED | OUTPATIENT
Start: 2025-09-20 | End: 2025-10-20

## 2025-11-17 ENCOUNTER — APPOINTMENT (OUTPATIENT)
Dept: PRIMARY CARE | Facility: CLINIC | Age: 60
End: 2025-11-17